# Patient Record
Sex: FEMALE | Race: WHITE | NOT HISPANIC OR LATINO | Employment: UNEMPLOYED | URBAN - METROPOLITAN AREA
[De-identification: names, ages, dates, MRNs, and addresses within clinical notes are randomized per-mention and may not be internally consistent; named-entity substitution may affect disease eponyms.]

---

## 2019-12-06 ENCOUNTER — OFFICE VISIT (OUTPATIENT)
Dept: URGENT CARE | Facility: CLINIC | Age: 24
End: 2019-12-06
Payer: COMMERCIAL

## 2019-12-06 VITALS
SYSTOLIC BLOOD PRESSURE: 111 MMHG | OXYGEN SATURATION: 98 % | RESPIRATION RATE: 18 BRPM | HEART RATE: 67 BPM | WEIGHT: 183 LBS | TEMPERATURE: 99 F | HEIGHT: 60 IN | DIASTOLIC BLOOD PRESSURE: 78 MMHG | BODY MASS INDEX: 35.93 KG/M2

## 2019-12-06 DIAGNOSIS — S09.11XA STRAIN OF JAW: Primary | ICD-10-CM

## 2019-12-06 DIAGNOSIS — S03.00XA TMJ (DISLOCATION OF TEMPOROMANDIBULAR JOINT), INITIAL ENCOUNTER: ICD-10-CM

## 2019-12-06 PROBLEM — E28.2 PCOS (POLYCYSTIC OVARIAN SYNDROME): Status: ACTIVE | Noted: 2019-12-06

## 2019-12-06 PROCEDURE — 99213 OFFICE O/P EST LOW 20 MIN: CPT | Mod: S$GLB,,, | Performed by: FAMILY MEDICINE

## 2019-12-06 PROCEDURE — 99213 PR OFFICE/OUTPT VISIT, EST, LEVL III, 20-29 MIN: ICD-10-PCS | Mod: S$GLB,,, | Performed by: FAMILY MEDICINE

## 2019-12-06 RX ORDER — CYCLOBENZAPRINE HCL 10 MG
10 TABLET ORAL NIGHTLY PRN
Qty: 10 TABLET | Refills: 0 | Status: SHIPPED | OUTPATIENT
Start: 2019-12-06 | End: 2019-12-16

## 2019-12-06 RX ORDER — MELOXICAM 15 MG/1
15 TABLET ORAL DAILY PRN
Qty: 15 TABLET | Refills: 0 | Status: SHIPPED | OUTPATIENT
Start: 2019-12-06

## 2019-12-06 NOTE — PATIENT INSTRUCTIONS
Treating Strains and Sprains  Strains and sprains happen when muscles or other soft tissues near your bones stretch or tear. These injuries can cause bruising, swelling, and pain. To ease your discomfort and speed the healing of your strain or sprain, follow the tips below. Remember, a strain or sprain can take 6 to 8 weeks to heal.     Important Note: Do not give aspirin to children or teens without discussing it with your healthcare provider first.        Ice first, heat later  · Use ice for the first 24 to 48 hours after injury. Ice helps prevent swelling and reduce pain. Ice the injury for no more than 20 minutes at a time and allow at least  20 minutes between icing sessions.  · Apply heat after the first 72 hours, once the swelling has gone down. Heat relaxes muscles and increases blood flow. Soak the injured area in warm water or use a heating pad set on low for no more than 15 minutes at a time.  Wrap and elevate  · Wrap an injured limb firmly with an elastic bandage. This provides support and helps prevent swelling. Dont wear an elastic bandage overnight. Watch for tingling, numbness, or increased pain, and remove the bandage immediately if any of these occurs.  · Elevate the injured area to help reduce swelling and throbbing. Its best to raise an injured limb above the level of your heart.     Medicines  · Over-the-counter medicines such as acetaminophen or ibuprofen can help reduce pain. Some also help reduce swelling.  · Take medicine only as directed.  · Rest the area even if medicines are controlling the pain.  Rest  · Rest the injured area by not using it for 24 hours.  · When youre ready, return slowly to your normal activities. Rest the injured area often.  · Dont use or walk on an injured limb if it hurts.  Date Last Reviewed: 9/3/2015  © 1415-9311 [employer]. [address] [place] [address], [place] [address]. All rights reserved. This information is not intended as a substitute for  professional medical care. Always follow your healthcare professional's instructions.        Jaw Dislocation  The jawbone (the mandible) is connected to the skull by a hinge joint in front of each ear (the temporomandibular joint, or TMJ). Your mandible has come out of the joint (it has been dislocated).     Causes  Anything that causes extreme opening of the mouth can cause a dislocation. This includes:  · Eating  · Yawning  · Laughing  · Vomiting  · Kissing  · Dental treatment  · Trauma  Symptoms  · Inability to close the mouth  · Pain  · Drooling  · Trouble speaking  Right after a dislocation, the jaw muscles often go into spasm. This prevents the jawbone from sliding back into the joint. To treat this, a healthcare provider must guide the mandible back into the joint. Intravenous (IV) medicines are often used to reduce the muscle spasm and keep you comfortable.  You may be given a bandage or soft collar. This is to keep the mouth from opening widely.  You may have another jaw dislocation. You are especially at risk during the next 6 weeks. It takes this long for the ligaments that support the joint to heal. Repeated mandible dislocation may cause arthritis and chronic pain at the joint. In these cases, surgery may be required to prevent further dislocations.  Home care  · To reduce pain and swelling, apply an ice pack (ice cubes in a plastic bag, wrapped in a towel) over the injured area for 10 to 20 minutes every 1 to 2 hours for the first day. Continue using an ice pack 3 to 4 times a day until the pain and swelling goes away.  · You can take acetaminophen or ibuprofen for pain, unless you were given a different pain medicine to use. (Note: If you have chronic liver or kidney disease, have ever had a stomach ulcer or gastrointestinal bleeding, or are taking blood-thinning medicines, talk with your doctor before using these medicines.) Do not give aspirin to anyone under 18 years of age who is ill with a fever.  It may cause severe liver damage.  · For the first week, eat only soft foods, such as gelatin, cooked cereal, ice cream, applesauce, bananas, eggs, pasta, cottage cheese, soups, and yogurt.  · For the next 6 weeks, do not open your mouth wide. Also be careful when you yawn, bite large pieces of food, scream or yell, sing, or call out loudly.  · If you need to yawn, put your fist under your chin to prevent your mouth from opening up too wide.  Follow-up care  Follow up with your healthcare provider, or as advised. If X-rays were taken, they will be reviewed by a specialist. You will be notified of the results, especially if they affect treatment.  Call 911  Call emergency services right away if any of these occur:  · Trouble breathing or swallowing, wheezing  · Confusion  · Extreme drowsiness or trouble awakening  · Fainting or loss of consciousness  · Rapid heart rate  When to seek medical advice  Call your healthcare provider right away if any of these occur:  · Jaw pain not relieved by the pain medicine  · Inability to close your mouth fully (recurrent dislocation)  Date Last Reviewed: 7/30/2015  © 5231-0503 [employer]. [address] [place] [address], [place] [address]. All rights reserved. This information is not intended as a substitute for professional medical care. Always follow your healthcare professional's instructions.

## 2019-12-06 NOTE — PROGRESS NOTES
Subjective:       Patient ID: Claudia Castillo is a 24 y.o. female.    Vitals:  height is 5' (1.524 m) and weight is 83 kg (183 lb). Her tympanic temperature is 99.2 °F (37.3 °C). Her blood pressure is 111/78 and her pulse is 67. Her respiration is 18 and oxygen saturation is 98%.     Chief Complaint: Jaw Pain    Patient present today with jaw pain started yesterday night, patient has not tried taking any medication to help with pain. Patient states she was having weird spasms when she woke up and it hard to drink liquids. Patient states she having difficulty breathing as well     Facial Pain   The current episode started yesterday. The problem has been gradually worsening. Pertinent negatives include no arthralgias, chest pain, chills, congestion, coughing, fatigue, fever, headaches, joint swelling, myalgias, nausea, rash, sore throat, vertigo, vomiting or weakness. The symptoms are aggravated by swallowing.       Constitution: Negative for chills, fatigue and fever.   HENT: Negative for congestion and sore throat.    Neck: Negative for painful lymph nodes.   Cardiovascular: Negative for chest pain and leg swelling.   Eyes: Negative for double vision and blurred vision.   Respiratory: Negative for cough and shortness of breath.    Gastrointestinal: Negative for nausea, vomiting and diarrhea.   Genitourinary: Negative for dysuria, frequency, urgency and history of kidney stones.   Musculoskeletal: Positive for pain. Negative for joint pain, joint swelling, muscle cramps and muscle ache.   Skin: Negative for color change, pale, rash and bruising.   Allergic/Immunologic: Negative for seasonal allergies.   Neurological: Negative for dizziness, history of vertigo, light-headedness, passing out and headaches.   Hematologic/Lymphatic: Negative for swollen lymph nodes.   Psychiatric/Behavioral: Negative for nervous/anxious, sleep disturbance and depression. The patient is not nervous/anxious.        Objective:      Physical  Exam      Assessment:       1. Strain of jaw    2. TMJ (dislocation of temporomandibular joint), initial encounter        Plan:         Strain of jaw  -     cyclobenzaprine (FLEXERIL) 10 MG tablet; Take 1 tablet (10 mg total) by mouth nightly as needed for Muscle spasms.  Dispense: 10 tablet; Refill: 0  -     meloxicam (MOBIC) 15 MG tablet; Take 1 tablet (15 mg total) by mouth daily as needed for Pain.  Dispense: 15 tablet; Refill: 0    TMJ (dislocation of temporomandibular joint), initial encounter  -     cyclobenzaprine (FLEXERIL) 10 MG tablet; Take 1 tablet (10 mg total) by mouth nightly as needed for Muscle spasms.  Dispense: 10 tablet; Refill: 0  -     meloxicam (MOBIC) 15 MG tablet; Take 1 tablet (15 mg total) by mouth daily as needed for Pain.  Dispense: 15 tablet; Refill: 0      Patient Instructions       Treating Strains and Sprains  Strains and sprains happen when muscles or other soft tissues near your bones stretch or tear. These injuries can cause bruising, swelling, and pain. To ease your discomfort and speed the healing of your strain or sprain, follow the tips below. Remember, a strain or sprain can take 6 to 8 weeks to heal.     Important Note: Do not give aspirin to children or teens without discussing it with your healthcare provider first.        Ice first, heat later  · Use ice for the first 24 to 48 hours after injury. Ice helps prevent swelling and reduce pain. Ice the injury for no more than 20 minutes at a time and allow at least  20 minutes between icing sessions.  · Apply heat after the first 72 hours, once the swelling has gone down. Heat relaxes muscles and increases blood flow. Soak the injured area in warm water or use a heating pad set on low for no more than 15 minutes at a time.  Wrap and elevate  · Wrap an injured limb firmly with an elastic bandage. This provides support and helps prevent swelling. Dont wear an elastic bandage overnight. Watch for tingling, numbness, or increased  pain, and remove the bandage immediately if any of these occurs.  · Elevate the injured area to help reduce swelling and throbbing. Its best to raise an injured limb above the level of your heart.     Medicines  · Over-the-counter medicines such as acetaminophen or ibuprofen can help reduce pain. Some also help reduce swelling.  · Take medicine only as directed.  · Rest the area even if medicines are controlling the pain.  Rest  · Rest the injured area by not using it for 24 hours.  · When youre ready, return slowly to your normal activities. Rest the injured area often.  · Dont use or walk on an injured limb if it hurts.  Date Last Reviewed: 9/3/2015  © 8061-9104 [employer]. [address] [place] [address], [place] [address]. All rights reserved. This information is not intended as a substitute for professional medical care. Always follow your healthcare professional's instructions.        Jaw Dislocation  The jawbone (the mandible) is connected to the skull by a hinge joint in front of each ear (the temporomandibular joint, or TMJ). Your mandible has come out of the joint (it has been dislocated).     Causes  Anything that causes extreme opening of the mouth can cause a dislocation. This includes:  · Eating  · Yawning  · Laughing  · Vomiting  · Kissing  · Dental treatment  · Trauma  Symptoms  · Inability to close the mouth  · Pain  · Drooling  · Trouble speaking  Right after a dislocation, the jaw muscles often go into spasm. This prevents the jawbone from sliding back into the joint. To treat this, a healthcare provider must guide the mandible back into the joint. Intravenous (IV) medicines are often used to reduce the muscle spasm and keep you comfortable.  You may be given a bandage or soft collar. This is to keep the mouth from opening widely.  You may have another jaw dislocation. You are especially at risk during the next 6 weeks. It takes this long for the ligaments that support the joint to heal.  Repeated mandible dislocation may cause arthritis and chronic pain at the joint. In these cases, surgery may be required to prevent further dislocations.  Home care  · To reduce pain and swelling, apply an ice pack (ice cubes in a plastic bag, wrapped in a towel) over the injured area for 10 to 20 minutes every 1 to 2 hours for the first day. Continue using an ice pack 3 to 4 times a day until the pain and swelling goes away.  · You can take acetaminophen or ibuprofen for pain, unless you were given a different pain medicine to use. (Note: If you have chronic liver or kidney disease, have ever had a stomach ulcer or gastrointestinal bleeding, or are taking blood-thinning medicines, talk with your doctor before using these medicines.) Do not give aspirin to anyone under 18 years of age who is ill with a fever. It may cause severe liver damage.  · For the first week, eat only soft foods, such as gelatin, cooked cereal, ice cream, applesauce, bananas, eggs, pasta, cottage cheese, soups, and yogurt.  · For the next 6 weeks, do not open your mouth wide. Also be careful when you yawn, bite large pieces of food, scream or yell, sing, or call out loudly.  · If you need to yawn, put your fist under your chin to prevent your mouth from opening up too wide.  Follow-up care  Follow up with your healthcare provider, or as advised. If X-rays were taken, they will be reviewed by a specialist. You will be notified of the results, especially if they affect treatment.  Call 911  Call emergency services right away if any of these occur:  · Trouble breathing or swallowing, wheezing  · Confusion  · Extreme drowsiness or trouble awakening  · Fainting or loss of consciousness  · Rapid heart rate  When to seek medical advice  Call your healthcare provider right away if any of these occur:  · Jaw pain not relieved by the pain medicine  · Inability to close your mouth fully (recurrent dislocation)  Date Last Reviewed: 7/30/2015  © 4863-0226  [employer]. [address] [place] [address], [place] [address]. All rights reserved. This information is not intended as a substitute for professional medical care. Always follow your healthcare professional's instructions.

## 2023-06-21 ENCOUNTER — HOSPITAL ENCOUNTER (EMERGENCY)
Facility: HOSPITAL | Age: 28
Discharge: HOME | End: 2023-06-21
Attending: STUDENT IN AN ORGANIZED HEALTH CARE EDUCATION/TRAINING PROGRAM
Payer: MEDICAID

## 2023-06-21 VITALS
HEART RATE: 74 BPM | WEIGHT: 200 LBS | BODY MASS INDEX: 39.27 KG/M2 | OXYGEN SATURATION: 98 % | HEIGHT: 60 IN | TEMPERATURE: 98 F | SYSTOLIC BLOOD PRESSURE: 110 MMHG | DIASTOLIC BLOOD PRESSURE: 77 MMHG | RESPIRATION RATE: 18 BRPM

## 2023-06-21 DIAGNOSIS — S09.90XA INJURY OF HEAD, INITIAL ENCOUNTER: Primary | ICD-10-CM

## 2023-06-21 DIAGNOSIS — S06.0X0A CONCUSSION WITHOUT LOSS OF CONSCIOUSNESS, INITIAL ENCOUNTER: ICD-10-CM

## 2023-06-21 LAB
B-HCG UR QL: NEGATIVE
CTP QC/QA: YES

## 2023-06-21 PROCEDURE — 81025 URINE PREGNANCY TEST: CPT | Performed by: STUDENT IN AN ORGANIZED HEALTH CARE EDUCATION/TRAINING PROGRAM

## 2023-06-21 PROCEDURE — 96374 THER/PROPH/DIAG INJ IV PUSH: CPT

## 2023-06-21 PROCEDURE — 63600175 PHARM REV CODE 636 W HCPCS: Performed by: STUDENT IN AN ORGANIZED HEALTH CARE EDUCATION/TRAINING PROGRAM

## 2023-06-21 PROCEDURE — 99285 EMERGENCY DEPT VISIT HI MDM: CPT | Mod: 25

## 2023-06-21 PROCEDURE — 25000003 PHARM REV CODE 250: Performed by: STUDENT IN AN ORGANIZED HEALTH CARE EDUCATION/TRAINING PROGRAM

## 2023-06-21 RX ORDER — BUTALBITAL, ACETAMINOPHEN AND CAFFEINE 50; 325; 40 MG/1; MG/1; MG/1
1 TABLET ORAL ONCE
Status: DISCONTINUED | OUTPATIENT
Start: 2023-06-21 | End: 2023-06-22 | Stop reason: HOSPADM

## 2023-06-21 RX ORDER — KETOROLAC TROMETHAMINE 30 MG/ML
15 INJECTION, SOLUTION INTRAMUSCULAR; INTRAVENOUS
Status: COMPLETED | OUTPATIENT
Start: 2023-06-21 | End: 2023-06-21

## 2023-06-21 RX ORDER — BUTALBITAL, ACETAMINOPHEN AND CAFFEINE 50; 325; 40 MG/1; MG/1; MG/1
1 TABLET ORAL EVERY 4 HOURS PRN
Qty: 10 TABLET | Refills: 0 | Status: SHIPPED | OUTPATIENT
Start: 2023-06-21 | End: 2023-07-21

## 2023-06-21 RX ADMIN — KETOROLAC TROMETHAMINE 15 MG: 30 INJECTION, SOLUTION INTRAMUSCULAR; INTRAVENOUS at 10:06

## 2023-06-21 RX ADMIN — SODIUM CHLORIDE 1000 ML: 0.9 INJECTION, SOLUTION INTRAVENOUS at 10:06

## 2023-06-22 NOTE — DISCHARGE INSTRUCTIONS

## 2023-06-22 NOTE — ED PROVIDER NOTES
"Encounter Date: 6/21/2023       History     Chief Complaint   Patient presents with    Head Injury     Pt states she fell into a 5ft hole 6 days ago, headache ever since. States she lost consciousness today. Denies injury today. No vomiting.  Blurry vision. Took Ibuprofen "few hrs ago"    Neck Pain     28-year-old female accidentally fell into a large hole in her backyard that was hidden underneath some grass clippings.  This occurred 5 days ago.  She struck the front of her head while falling.  Since that time she is had an ongoing headache associated with light sensitivity associated and nausea.  She also reports decreased sleep since injury.  Today, while cooking, she felt lightheaded and syncopized and woke up 3 hours later.  She denies any new injuries but still has ongoing headache.    Review of patient's allergies indicates:  No Known Allergies  No past medical history on file.  No past surgical history on file.  Family History   Problem Relation Age of Onset    Diabetes Father      Social History     Tobacco Use    Smoking status: Never   Substance Use Topics    Alcohol use: No    Drug use: No     Review of Systems   Constitutional:  Negative for activity change, appetite change, chills, fever and unexpected weight change.   HENT:  Negative for dental problem and drooling.    Eyes:  Negative for discharge and itching.   Respiratory:  Negative for cough, chest tightness, shortness of breath, wheezing and stridor.    Cardiovascular:  Negative for chest pain, palpitations and leg swelling.   Gastrointestinal:  Negative for abdominal distention, abdominal pain, diarrhea and nausea.   Genitourinary:  Negative for difficulty urinating, dysuria, frequency and urgency.   Musculoskeletal:  Negative for back pain, gait problem and joint swelling.   Neurological:  Positive for headaches. Negative for dizziness, syncope and numbness.   Psychiatric/Behavioral:  Negative for agitation, behavioral problems and confusion.  "     Physical Exam     Initial Vitals [06/21/23 2005]   BP Pulse Resp Temp SpO2   (!) 144/90 83 16 98.4 °F (36.9 °C) 98 %      MAP       --         Physical Exam    Nursing note and vitals reviewed.  Constitutional: She appears well-developed and well-nourished. She is not diaphoretic.   HENT:   Head: Normocephalic and atraumatic.   Mouth/Throat: Oropharynx is clear and moist.   Eyes: EOM are normal. Pupils are equal, round, and reactive to light. Right eye exhibits no discharge. Left eye exhibits no discharge.   Neck: No tracheal deviation present.   Normal range of motion.  Cardiovascular:  Normal rate, regular rhythm and intact distal pulses.           Pulmonary/Chest: No respiratory distress. She has no wheezes. She exhibits no tenderness.   Abdominal: Abdomen is soft. She exhibits no distension. There is no abdominal tenderness.   Musculoskeletal:         General: No tenderness or edema. Normal range of motion.      Cervical back: Normal range of motion.     Neurological: She is alert and oriented to person, place, and time. She has normal strength and normal reflexes. No cranial nerve deficit or sensory deficit. Coordination and gait normal. GCS eye subscore is 4. GCS verbal subscore is 5. GCS motor subscore is 6.   Normal finger to nose, heel to shin, rapid alternating movement.    Skin: Skin is warm and dry. No rash noted.   Psychiatric: She has a normal mood and affect. Her behavior is normal. Thought content normal.       ED Course   Procedures  Labs Reviewed   POCT URINE PREGNANCY          Imaging Results              CT Cervical Spine Without Contrast (Final result)  Result time 06/21/23 22:46:02      Final result by Emilie Montes MD (06/21/23 22:46:02)                   Narrative:    EXAM DESCRIPTION:  CT CERVICAL SPINE WITHOUT CONTRAST 6/21/2023 10:12 PM CDT  [facility]: CT CERVICAL SPINE WITHOUT IV CONTRAST    CLINICAL HISTORY:  28 years Female, Neck trauma, dangerous injury mechanism (Age  16-64y)    COMPARISON:  Cervical spine CT dated 11/11/2011    TECHNIQUE: Axial CT imaging of the cervical spine was performed without intravenous contrast material. Sagittal and coronal reconstructions were obtained.    The protocol utilizes one or more of the following dose reduction techniques: automated exposure control, adjustment of mA and/or kV according to patient size, and/or use of iterative reconstruction technique.    FINDINGS:    CURVATURE: Normal cervical lordosis is maintained.    BONES: No fracture, subluxation, or spondylolisthesis is seen. No lytic or blastic bone lesion is identified.    SOFT TISSUES: The visualized paraspinal soft tissues appear unremarkable.    No significant degenerative changes are appreciated. Please note evaluation for disc herniations, neural foramina narrowing, and spinal canal stenosis is limited on CT scan. These are better evaluated on MRI.    IMPRESSION:    UNREMARKABLE CT SCAN OF THE CERVICAL SPINE    Electronically signed by:  Emilie Montes MD  6/21/2023 10:46 PM CDT Workstation: [id number]                                     CT Head Without Contrast (Final result)  Result time 06/21/23 22:52:54      Final result by Emilie Montes MD (06/21/23 22:52:54)                   Narrative:    EXAM DESCRIPTION:  CT HEAD WITHOUT CONTRAST 6/21/2023 10:11 PM CDT  [facility]: CT HEAD WITHOUT IV CONTRAST    CLINICAL HISTORY:  28 years Female, Head trauma, intracranial venous injury suspected    COMPARISON:  Brain CT dated 11/11/2011    TECHNIQUE: Axial thin section CT images of the brain were obtained from the base of the skull to the vertex without intravenous contrast. Sagittal and coronal reconstructed images were also obtained.    The protocol utilizes one or more of the following dose reduction techniques: automated exposure control, adjustment of mA and/or kV according to patient size, and/or use of iterative reconstruction technique.    FINDINGS:    BRAIN: Gray-white matter  differentiation is preserved.  No evidence of an acute transcortical infarct is noted.    CEREBROSPINAL FLUID SPACES: Ventricles, cerebral sulci, and basal cisterns are normal for patient's age.  No hydrocephalus or ventricular entrapment is noted.  No extraaxial fluid collection is noted.    MASS EFFECT: There is no mass effect or midline shift.    HEMORRHAGE: No intracranial hemorrhage is noted.    SELLA: The sella and suprasellar cistern appear unremarkable.    PARANASAL SINUSES: The visualized portions of the paranasal sinuses are clear.    MASTOID AIR CELLS: The visualized portions of the mastoid air cells are well aerated.    ORBITS: The visualized portions of the orbits appear unremarkable.    SOFT TISSUES: No scalp soft tissue swelling is noted.    CALVARIUM: No calvarial fracture is noted.  No destructive lytic bone lesion is seen.    ATHEROSCLEROSIS: No significant calcified intracranial atherosclerosis is noted.    IMPRESSION:    NO ACUTE INTRACRANIAL ABNORMALITY IS NOTED.    Electronically signed by:  Emilie Montes MD  6/21/2023 10:52 PM CDT Workstation: [id number]                                     Medications   sodium chloride 0.9% bolus 1,000 mL 1,000 mL (1,000 mLs Intravenous New Bag 6/21/23 2242)   ketorolac injection 15 mg (15 mg Intravenous Given 6/21/23 2241)                            28-year-old female presents for 5 day history of headache after a fall 5 ft, striking her face and extending or neck.  Vitals normal.  She has no neurological deficit on my exam.  She has no signs of active face trauma.  Given the fall and ongoing headache, CT head ordered to rule out acute intracranial abnormality.  No evidence of skull fracture, subdural or epidural hematoma.  Patient's symptoms are most consistent with concussion.  Patient's pain treated here with Toradol, IV fluids with resolution of headache.  I advised her to continue taking ibuprofen at home along with Tylenol every 6 hours for pain, and  discharge her with concussion management advice.  She is comfortable with plan and ready to go home.  Clinical Impression:   Final diagnoses:  [S09.90XA] Injury of head, initial encounter (Primary)  [S06.0X0A] Concussion without loss of consciousness, initial encounter        ED Disposition Condition    Discharge Stable          ED Prescriptions       Medication Sig Dispense Start Date End Date Auth. Provider    butalbital-acetaminophen-caffeine -40 mg (FIORICET, ESGIC) -40 mg per tablet Take 1 tablet by mouth every 4 (four) hours as needed for Pain. 10 tablet 6/21/2023 7/21/2023 David Craven MD          Follow-up Information       Follow up With Specialties Details Why Contact [facility]  Schedule an appointment as soon as possible for a visit in 1 day to establish primary care physician Elicia REYES [id number]  [phone]               David Craven MD  06/23/23 0330

## 2025-04-14 ENCOUNTER — HOSPITAL ENCOUNTER (EMERGENCY)
Facility: HOSPITAL | Age: 30
Discharge: HOME | End: 2025-04-14
Attending: EMERGENCY MEDICINE
Payer: MEDICAID

## 2025-04-14 VITALS
SYSTOLIC BLOOD PRESSURE: 114 MMHG | RESPIRATION RATE: 18 BRPM | WEIGHT: 215 LBS | HEART RATE: 80 BPM | TEMPERATURE: 99 F | BODY MASS INDEX: 42.21 KG/M2 | HEIGHT: 60 IN | OXYGEN SATURATION: 98 % | DIASTOLIC BLOOD PRESSURE: 72 MMHG

## 2025-04-14 DIAGNOSIS — R07.9 CHEST PAIN, UNSPECIFIED TYPE: Primary | ICD-10-CM

## 2025-04-14 DIAGNOSIS — R07.9 CHEST PAIN: ICD-10-CM

## 2025-04-14 DIAGNOSIS — R55 SYNCOPE, UNSPECIFIED SYNCOPE TYPE: ICD-10-CM

## 2025-04-14 LAB
ABSOLUTE EOSINOPHIL (SMH): 0.13 K/UL
ABSOLUTE MONOCYTE (SMH): 1.11 K/UL (ref 0.3–1)
ABSOLUTE NEUTROPHIL COUNT (SMH): 8.4 K/UL (ref 1.8–7.7)
ALBUMIN SERPL-MCNC: 4.2 G/DL (ref 3.5–5.2)
ALP SERPL-CCNC: 45 UNIT/L (ref 55–135)
ALT SERPL-CCNC: 24 UNIT/L (ref 10–44)
ANION GAP (SMH): 7 MMOL/L (ref 8–16)
AST SERPL-CCNC: 15 UNIT/L (ref 10–40)
B-HCG FREE SERPL-ACNC: <0.6 MIU/ML
B-HCG UR QL: NEGATIVE
BASOPHILS # BLD AUTO: 0.08 K/UL
BASOPHILS NFR BLD AUTO: 0.6 %
BILIRUB SERPL-MCNC: 0.3 MG/DL (ref 0.1–1)
BNP SERPL-MCNC: 23 PG/ML
BUN SERPL-MCNC: 10 MG/DL (ref 6–20)
CALCIUM SERPL-MCNC: 9 MG/DL (ref 8.7–10.5)
CHLORIDE SERPL-SCNC: 105 MMOL/L (ref 95–110)
CO2 SERPL-SCNC: 23 MMOL/L (ref 23–29)
CREAT SERPL-MCNC: 0.8 MG/DL (ref 0.5–1.4)
CTP QC/QA: YES
D DIMER PPP IA.FEU-MCNC: 0.33 MG/L FEU
ERYTHROCYTE [DISTWIDTH] IN BLOOD BY AUTOMATED COUNT: 13.8 % (ref 11.5–14.5)
GFR SERPLBLD CREATININE-BSD FMLA CKD-EPI: >60 ML/MIN/1.73/M2
GLUCOSE SERPL-MCNC: 103 MG/DL (ref 70–110)
HCT VFR BLD AUTO: 42.5 % (ref 37–48.5)
HGB BLD-MCNC: 13.7 GM/DL (ref 12–16)
IMM GRANULOCYTES # BLD AUTO: 0.21 K/UL (ref 0–0.04)
IMM GRANULOCYTES NFR BLD AUTO: 1.6 % (ref 0–0.5)
LYMPHOCYTES # BLD AUTO: 3.6 K/UL (ref 1–4.8)
MAGNESIUM SERPL-MCNC: 2 MG/DL (ref 1.6–2.6)
MCH RBC QN AUTO: 28.1 PG (ref 27–31)
MCHC RBC AUTO-ENTMCNC: 32.2 G/DL (ref 32–36)
MCV RBC AUTO: 87 FL (ref 82–98)
NUCLEATED RBC (/100WBC) (SMH): 0 /100 WBC
PLATELET # BLD AUTO: 321 K/UL (ref 150–450)
PMV BLD AUTO: 9.8 FL (ref 9.2–12.9)
POTASSIUM SERPL-SCNC: 3.6 MMOL/L (ref 3.5–5.1)
PROT SERPL-MCNC: 7.4 GM/DL (ref 6–8.4)
RBC # BLD AUTO: 4.88 M/UL (ref 4–5.4)
RELATIVE EOSINOPHIL (SMH): 1 % (ref 0–8)
RELATIVE LYMPHOCYTE (SMH): 26.7 % (ref 18–48)
RELATIVE MONOCYTE (SMH): 8.2 % (ref 4–15)
RELATIVE NEUTROPHIL (SMH): 61.9 % (ref 38–73)
SODIUM SERPL-SCNC: 135 MMOL/L (ref 136–145)
TROPONIN HIGH SENSITIVE (SMH): <2.3 PG/ML
TROPONIN HIGH SENSITIVE (SMH): <2.3 PG/ML
TSH SERPL-ACNC: 1.52 UIU/ML (ref 0.34–5.6)
WBC # BLD AUTO: 13.48 K/UL (ref 3.9–12.7)

## 2025-04-14 PROCEDURE — 80053 COMPREHEN METABOLIC PANEL: CPT | Performed by: EMERGENCY MEDICINE

## 2025-04-14 PROCEDURE — 81025 URINE PREGNANCY TEST: CPT | Performed by: EMERGENCY MEDICINE

## 2025-04-14 PROCEDURE — 84702 CHORIONIC GONADOTROPIN TEST: CPT | Performed by: EMERGENCY MEDICINE

## 2025-04-14 PROCEDURE — 83735 ASSAY OF MAGNESIUM: CPT | Performed by: EMERGENCY MEDICINE

## 2025-04-14 PROCEDURE — 84443 ASSAY THYROID STIM HORMONE: CPT | Performed by: EMERGENCY MEDICINE

## 2025-04-14 PROCEDURE — 85025 COMPLETE CBC W/AUTO DIFF WBC: CPT | Performed by: EMERGENCY MEDICINE

## 2025-04-14 PROCEDURE — 93010 ELECTROCARDIOGRAM REPORT: CPT | Mod: ,,, | Performed by: GENERAL PRACTICE

## 2025-04-14 PROCEDURE — 99285 EMERGENCY DEPT VISIT HI MDM: CPT | Mod: 25

## 2025-04-14 PROCEDURE — 83880 ASSAY OF NATRIURETIC PEPTIDE: CPT | Performed by: EMERGENCY MEDICINE

## 2025-04-14 PROCEDURE — 25000003 PHARM REV CODE 250: Performed by: EMERGENCY MEDICINE

## 2025-04-14 PROCEDURE — 96360 HYDRATION IV INFUSION INIT: CPT

## 2025-04-14 PROCEDURE — 36415 COLL VENOUS BLD VENIPUNCTURE: CPT | Performed by: EMERGENCY MEDICINE

## 2025-04-14 PROCEDURE — 85379 FIBRIN DEGRADATION QUANT: CPT | Performed by: EMERGENCY MEDICINE

## 2025-04-14 PROCEDURE — 93005 ELECTROCARDIOGRAM TRACING: CPT | Performed by: GENERAL PRACTICE

## 2025-04-14 PROCEDURE — 84484 ASSAY OF TROPONIN QUANT: CPT | Performed by: EMERGENCY MEDICINE

## 2025-04-14 RX ORDER — SODIUM CHLORIDE 9 MG/ML
1000 INJECTION, SOLUTION INTRAVENOUS
Status: COMPLETED | OUTPATIENT
Start: 2025-04-14 | End: 2025-04-14

## 2025-04-14 RX ORDER — PANTOPRAZOLE SODIUM 20 MG/1
20 TABLET, DELAYED RELEASE ORAL DAILY
Qty: 30 TABLET | Refills: 0 | Status: SHIPPED | OUTPATIENT
Start: 2025-04-14 | End: 2026-04-14

## 2025-04-14 RX ADMIN — SODIUM CHLORIDE 1000 ML: 9 INJECTION, SOLUTION INTRAVENOUS at 04:04

## 2025-04-14 NOTE — DISCHARGE INSTRUCTIONS
Please follow up with [facility] as directed for further evaluation, definitive care  Return if condition becomes worse or for any concerns

## 2025-04-14 NOTE — ED NOTES
04/14/25 1429 04/14/25 1430 04/14/25 1431   Vital Signs   Pulse 87 82 85   Resp 18  --   --    /67 129/80 125/79   Patient Position Lying Sitting Standing     Orthostatic VS

## 2025-04-14 NOTE — Clinical Note
"Claudia Simmons" Jonathan was seen and treated in our emergency department on 4/14/2025.  She may return to work on 04/16/2025.       If you have any questions or concerns, please don't hesitate to call.      Marisol Hammond, MAGGIE"

## 2025-04-14 NOTE — ED PROVIDER NOTES
Encounter Date: 4/14/2025       History     Chief Complaint   Patient presents with    Chest Pain     Pt states she has pressure in her chest every morning when she wakes up x 2 weeks. Also requesting a blood pregnancy test.      29-year-old female presents emergency department denies any significant medical history presents emergency department reports that she has a history of PCOS only, currently on metformin she does have a history of irregular menstrual cycles her last cycle was February 17th she states for about 3 weeks now she has been waking up with chest pressure, she also reports, fatigue, intermittent dizziness, with near syncope.  Patient reports she took 2 home pregnancy tests that were negative, she is requesting a blood test to see if she is pregnant.  Patient denies smoking history, birth control pills, no recent URI symptoms or fevers.  Denies any known ill contacts.  Patient's parents are alive and well with no significant medical history, there has been no sudden death in the family at an early age.  Patient is not taking any medications over-the-counter for relief of symptoms, she is unsure of what makes symptoms better or worse.     The history is provided by the patient.     Review of patient's allergies indicates:  No Known Allergies  History reviewed. No pertinent past medical history.  History reviewed. No pertinent surgical history.  Family History   Problem Relation Name Age of Onset    Diabetes Father       Social History[1]  Review of Systems   Constitutional: Negative.  Negative for chills, diaphoresis and fever.   HENT: Negative.     Respiratory:  Negative for cough and shortness of breath.    Cardiovascular:  Positive for chest pain.   Gastrointestinal: Negative.    Genitourinary: Negative.    Musculoskeletal: Negative.    Neurological:  Positive for dizziness.   Hematological: Negative.    Psychiatric/Behavioral: Negative.     All other systems reviewed and are  negative.      Physical Exam     Initial Vitals [04/14/25 1317]   BP Pulse Resp Temp SpO2   135/81 101 18 99.3 °F (37.4 °C) 98 %      MAP       --         Physical Exam    Nursing note and vitals reviewed.  Constitutional: She appears well-developed and well-nourished.   HENT:   Head: Normocephalic and atraumatic.   Right Ear: External ear normal.   Left Ear: External ear normal.   Eyes: Conjunctivae and EOM are normal. Pupils are equal, round, and reactive to light.   Neck: Neck supple.   Normal range of motion.  Cardiovascular:  Normal rate, regular rhythm, normal heart sounds and intact distal pulses.           Pulmonary/Chest: Breath sounds normal. No respiratory distress. She exhibits no tenderness.   Abdominal: Abdomen is soft. Bowel sounds are normal. She exhibits no distension and no mass. There is no abdominal tenderness. There is no rebound and no guarding.   Musculoskeletal:         General: Normal range of motion.      Cervical back: Normal range of motion and neck supple.     Neurological: She is alert and oriented to person, place, and time. She has normal strength. GCS score is 15. GCS eye subscore is 4. GCS verbal subscore is 5. GCS motor subscore is 6.   Skin: Skin is warm. No rash noted.   Psychiatric: She has a normal mood and affect.         ED Course   Procedures  Labs Reviewed   COMPREHENSIVE METABOLIC PANEL - Abnormal       Result Value    Sodium 135 (*)     Potassium 3.6      Chloride 105      CO2 23      Glucose 103      BUN 10      Creatinine 0.8      Calcium 9.0      Protein Total 7.4      Albumin 4.2      Bilirubin Total 0.3      ALP 45 (*)     AST 15      ALT 24      Anion Gap 7 (*)     eGFR >60     CBC WITH DIFFERENTIAL - Abnormal    WBC 13.48 (*)     RBC 4.88      Hgb 13.7      Hct 42.5      MCV 87      MCH 28.1      MCHC 32.2      RDW 13.8      Platelet Count 321      MPV 9.8      Nucleated RBC 0      Neut % 61.9      Lymph % 26.7      Mono % 8.2      Eos % 1.0      Basophil % 0.6       Imm Grans % 1.6 (*)     Neut # 8.4 (*)     Lymph # 3.60      Mono # 1.11 (*)     Eos # 0.13      Baso # 0.08      Imm Grans # 0.21 (*)    MAGNESIUM - Normal    Magnesium 2.0     TROPONIN I HIGH SENSITIVITY - Normal    Troponin High Sensitive <2.3     TROPONIN I HIGH SENSITIVITY - Normal    Troponin High Sensitive <2.3     B-TYPE NATRIURETIC PEPTIDE - Normal    BNP 23     TSH - Normal    TSH 1.521     D DIMER, QUANTITATIVE - Normal    D-Dimer 0.33     CBC W/ AUTO DIFFERENTIAL    Narrative:     The following orders were created for panel order CBC auto differential.  Procedure                               Abnormality         Status                     ---------                               -----------         ------                     CBC with Differential[[id number]]       Abnormal            Final result                 Please view results for these tests on the individual orders.   HCG, QUANTITATIVE    Beta HCG Quant <0.60     EXTRA TUBES    Narrative:     The following orders were created for panel order EXTRA TUBES.  Procedure                               Abnormality         Status                     ---------                               -----------         ------                     Light Blue Top Hold[[id number]]                             In process                   Please view results for these tests on the individual orders.   LIGHT BLUE TOP HOLD   POCT URINE PREGNANCY    POC Preg Test, Ur Negative       Acceptable Yes          ECG Results              EKG 12-lead (In process)        Collection Time Result Time QRS Duration OHS QTC Calculation    04/14/25 13:22:51 04/14/25 13:42:24 98 435                     In process by Interface, Lab In [facility] (04/14/25 13:42:30)                   Narrative:    Test Reason : R07.9,    Vent. Rate :  89 BPM     Atrial Rate :  89 BPM     P-R Int : 152 ms          QRS Dur :  98 ms      QT Int : 358 ms       P-R-T Axes :  60   7  43 degrees    QTcB  Int : 435 ms    Normal sinus rhythm  Normal ECG  No previous ECGs available    Referred By: AAAREFERRAL SELF           Confirmed By:                                   Imaging Results              X-Ray Chest PA And Lateral (Final result)  Result time 04/14/25 16:29:37   Procedure changed from X-Ray Chest AP Portable     Final result by Taras Stark MD (04/14/25 16:29:37)                   Impression:      No evidence of acute cardiopulmonary disease.      Electronically signed by: Taras Stark  Date:    04/14/2025  Time:    16:29               Narrative:    EXAMINATION:  XR CHEST PA AND LATERAL    CLINICAL HISTORY:  Chest Pain;    FINDINGS:  PA and lateral chest radiograph compared to prior exams show the trachea is midline, with the cardiomediastinal silhouette and pulmonary vascular distribution within normal limits.    There is unchanged asymmetric elevation of the right hemidiaphragm, with no consolidation, pleural effusion or evidence of pulmonary edema. No confluent infiltrates or pneumothorax. There are no significant osseous abnormalities.                                       Medications   0.9% NaCl infusion (0 mLs Intravenous Stopped 4/14/25 1724)     Medical Decision Making  29-year-old female presents emergency department denies any significant medical history presents emergency department reports that she has a history of PCOS only, currently on metformin she does have a history of irregular menstrual cycles her last cycle was February 17th she states for about 3 weeks now she has been waking up with chest pressure, she also reports, fatigue, intermittent dizziness, with near syncope.  Patient reports she took 2 home pregnancy tests that were negative, she is requesting a blood test to see if she is pregnant.  Patient denies smoking history, birth control pills, no recent URI symptoms or fevers.  Denies any known ill contacts.  Patient's parents are alive and well with no significant medical history,  there has been no sudden death in the family at an early age.  Patient is not taking any medications over-the-counter for relief of symptoms, she is unsure of what makes symptoms better or worse.     The history is provided by the patient.     Considerations include but not limited to, ACS, electrolyte abnormalities, pregnancy, dehydration, GERD, pneumonia, CVA    29-year-old female presents emergency department with multiple complaints and symptoms including intermittent dizziness, intermittent chest pain, patient reported that she was concerned that she maybe pregnant and all her symptoms related to pregnancy.  Patient's UPT here is negative she also had a hCG level which was negative.  Patient had no significant electrolyte abnormality she had 2 high sensitivity troponins that were normal she has an EKG which revealed normal sinus rhythm with no ischemic changes or ST elevation.  Urinalysis was negative patient was not orthostatic.  Patient was given a L of fluids, she reports that she feels better.  Patient's symptoms may be related to GERD as she reports that she is having intermittent chest pain that she notices when she wakes up.  For this reason I will start the patient on a PPI.  Patient will be [facility] to [facility].  Patient will also be referred to have an outpatient stress, and to monitor as well as follow up with Cardiology for near syncope.  The patient was personally evaluated by DR mirza as well       Attending Note:  I provided a face to face evaluation of this patient.  I discussed the patient's care with Advanced Practice Clinician.  I reviewed their note and agree with the history, physical, assessment, diagnosis, treatment, all procedures performed, xray and EKG interpretations and discharge plan provided by the Advanced Practice Clinician. My overall impression is chest pain, syncope.  Patient is a 29-year-old female with history of obesity and PCOS feeling fatigued, chest pressure each  morning that has resolved after a proximally 10 minutes and that has not returned throughout the day and reports that she has been more fatigued than usual in his passed out twice in the past several weeks.  No personal or family cardiac history.  No history of PE or DVT no drug use.  She is concerned for pregnancy but that has not pregnant.   The patient has been instructed to follow up with their physician or the one provided as well as specific return precautions.   Lisandra Hill M.D. 4/14/2025 6:46 PM        Amount and/or Complexity of Data Reviewed  External Data Reviewed: labs, radiology, ECG and notes.  Labs: ordered. Decision-making details documented in ED Course.  Radiology: ordered. Decision-making details documented in ED Course.  ECG/medicine tests: ordered. Decision-making details documented in ED Course.    Risk  Prescription drug management.      Additional MDM:   PERC Rule:   Age is greater than or equal to 50 = 0.0  Heart Rate is greater than or equal to 100 = 0.0  SaO2 on room air < 95% = 0.0  Unilateral leg swelling = 0.0  Hemoptysis = 0.0  Recent surgery or trauma = 0.0  Prior PE or DVT =  0.0  Hormone use = 0.00  PERC Score = 0              ED Course as of 04/14/25 1756   Mon Apr 14, 2025   1719 Beta quant less than 0.6  D-dimer negative  Troponin negative x2  Normal TSH  Normal BNP  White count 13.48 otherwise normal normal Mag and normal CMP [RM]   1719 I independently interpreted and reviewed chest x-ray which reveals no acute abnormality [RM]      ED Course User Index  [RM] Lisandra Hill MD                           Clinical Impression:  Final diagnoses:  [R07.9] Chest pain  [R07.9] Chest pain, unspecified type (Primary)  [R55] Syncope, unspecified syncope type          ED Disposition Condition    Discharge Stable          ED Prescriptions       Medication Sig Dispense Start Date End Date Auth. Provider    pantoprazole (PROTONIX) 20 MG tablet Take 1 tablet (20 mg total) by mouth  once daily. 30 tablet 4/14/2025 4/14/2026 Marisol Hammond FNP          Follow-up Information       Follow up With Specialties Details Why Contact Info    [facility]  Schedule an appointment as soon as possible for a visit in 2 days  [address] [place] [id number]  [phone]                 Marisol Hammond FNP  04/14/25 5956         [1]   Social History  Tobacco Use    Smoking status: Never   Substance Use Topics    Alcohol use: No    Drug use: No        Lisandra Hill MD  04/14/25 9625

## 2025-04-15 LAB
OHS QRS DURATION: 98 MS
OHS QTC CALCULATION: 435 MS

## 2025-04-15 NOTE — PLAN OF CARE
04/15/25 1052   Discharge Reassessment   Assessment Type Discharge Planning Reassessment   Did the patient's condition or plan change since previous assessment? Yes   Discharge Plan A Home with family   Discharge Plan B Home   Post-Acute Status   Discharge Delays None known at this time     Pt has [facility] appointment scheduled for on 4/28/2025 w/Anna Singh NP arrival time 12:00 P.M. Please bring ER D/C paperwork, proof of income/insurance, ID, and medication list. If you can't keep this appointment please contact [employer] [phone]. Pt will need referral for Holter monitor.

## 2025-04-16 ENCOUNTER — TELEPHONE (OUTPATIENT)
Dept: CARDIOLOGY | Facility: HOSPITAL | Age: 30
End: 2025-04-16

## 2025-04-17 ENCOUNTER — HOSPITAL ENCOUNTER (OUTPATIENT)
Dept: CARDIOLOGY | Facility: CLINIC | Age: 30
Discharge: HOME | End: 2025-04-17
Attending: EMERGENCY MEDICINE
Payer: MEDICAID

## 2025-04-17 ENCOUNTER — HOSPITAL ENCOUNTER (OUTPATIENT)
Dept: RADIOLOGY | Facility: HOSPITAL | Age: 30
Discharge: HOME | End: 2025-04-17
Attending: EMERGENCY MEDICINE
Payer: MEDICAID

## 2025-04-17 ENCOUNTER — CLINICAL SUPPORT (OUTPATIENT)
Dept: CARDIOLOGY | Facility: HOSPITAL | Age: 30
End: 2025-04-17
Attending: EMERGENCY MEDICINE
Payer: MEDICAID

## 2025-04-17 DIAGNOSIS — R55 SYNCOPE, UNSPECIFIED SYNCOPE TYPE: ICD-10-CM

## 2025-04-17 LAB
CV STRESS BASE HR: 77 BPM
DIASTOLIC BLOOD PRESSURE: 78 MMHG
OHS CV CPX 1 MINUTE RECOVERY HEART RATE: 108 BPM
OHS CV CPX 85 PERCENT MAX PREDICTED HEART RATE MALE: 162
OHS CV CPX ESTIMATED METS: 7.7
OHS CV CPX MAX PREDICTED HEART RATE: 191
OHS CV CPX PATIENT IS FEMALE: 1
OHS CV CPX PATIENT IS MALE: 0
OHS CV CPX PEAK DIASTOLIC BLOOD PRESSURE: 88 MMHG
OHS CV CPX PEAK HEAR RATE: 164 BPM
OHS CV CPX PEAK RATE PRESSURE PRODUCT: NORMAL
OHS CV CPX PEAK SYSTOLIC BLOOD PRESSURE: 154 MMHG
OHS CV CPX PERCENT MAX PREDICTED HEART RATE ACHIEVED: 91
OHS CV CPX RATE PRESSURE PRODUCT PRESENTING: 8239
STRESS ECHO POST EXERCISE DUR MIN: 5 MINUTES
STRESS ECHO POST EXERCISE DUR SEC: 40 SECONDS
SYSTOLIC BLOOD PRESSURE: 107 MMHG

## 2025-04-17 PROCEDURE — A9502 TC99M TETROFOSMIN: HCPCS | Performed by: EMERGENCY MEDICINE

## 2025-04-17 PROCEDURE — 93018 CV STRESS TEST I&R ONLY: CPT | Mod: ,,, | Performed by: GENERAL PRACTICE

## 2025-04-17 PROCEDURE — 93017 CV STRESS TEST TRACING ONLY: CPT

## 2025-04-17 PROCEDURE — 78452 HT MUSCLE IMAGE SPECT MULT: CPT | Mod: 26,,, | Performed by: RADIOLOGY

## 2025-04-17 PROCEDURE — 93016 CV STRESS TEST SUPVJ ONLY: CPT | Mod: ,,, | Performed by: GENERAL PRACTICE

## 2025-04-17 PROCEDURE — 78452 HT MUSCLE IMAGE SPECT MULT: CPT | Mod: TC

## 2025-04-17 RX ADMIN — TETROFOSMIN 26.4 MILLICURIE: 0.23 INJECTION, POWDER, LYOPHILIZED, FOR SOLUTION INTRAVENOUS at 10:04

## 2025-04-17 RX ADMIN — TETROFOSMIN 10.6 MILLICURIE: 0.23 INJECTION, POWDER, LYOPHILIZED, FOR SOLUTION INTRAVENOUS at 08:04

## 2025-04-17 NOTE — NURSING NOTE
Nuclear Treadmill Stress Test completed. Pt got lightheaded at end of exercise with hypotension. Pt was positioned in trendelenburg post stress with positive response. NP Makeda made aware of EKG changes and BP. Pt remained awake and alert during whole test and recovery. NP made aware of pt also had a vagal response to IV insertion which pt reported has happened before. Prior to getting on treadmill orthostatics were taken and WNL. No dizziness reported prior starting treadmill. Patient verbalized understanding of pre-post test instructions. Discharged from stress lab per Cardiology

## 2025-04-17 NOTE — NURSING
Patient advised that the stress and/or nuclear portions of the test were abnormal.  Patient should follow up with Cardiology. Messaged [facility] office, office not accepting new patients with Medicaid. Messaged Anne Agrawal, with Case management to help assist patient to find cardiologist that accepts new patients with Medicaid.       Patient advised if any chest pain/pressure prior to follow up appointment, to return to the ED for further assessment.   Patient verbalized understanding.

## 2025-08-24 ENCOUNTER — OFFICE VISIT (OUTPATIENT)
Dept: URGENT CARE | Facility: CLINIC | Age: 30
End: 2025-08-24
Payer: COMMERCIAL

## 2025-08-24 ENCOUNTER — HOSPITAL ENCOUNTER (EMERGENCY)
Facility: HOSPITAL | Age: 30
Discharge: HOME | End: 2025-08-24
Attending: STUDENT IN AN ORGANIZED HEALTH CARE EDUCATION/TRAINING PROGRAM
Payer: MEDICAID

## 2025-08-24 VITALS
DIASTOLIC BLOOD PRESSURE: 75 MMHG | BODY MASS INDEX: 41.23 KG/M2 | WEIGHT: 210 LBS | SYSTOLIC BLOOD PRESSURE: 109 MMHG | RESPIRATION RATE: 19 BRPM | TEMPERATURE: 98 F | HEIGHT: 60 IN | HEART RATE: 88 BPM | OXYGEN SATURATION: 98 %

## 2025-08-24 VITALS
OXYGEN SATURATION: 98 % | RESPIRATION RATE: 16 BRPM | HEIGHT: 60 IN | HEART RATE: 70 BPM | DIASTOLIC BLOOD PRESSURE: 83 MMHG | TEMPERATURE: 99 F | SYSTOLIC BLOOD PRESSURE: 120 MMHG | WEIGHT: 210 LBS | BODY MASS INDEX: 41.23 KG/M2

## 2025-08-24 DIAGNOSIS — R51.9 NONINTRACTABLE HEADACHE, UNSPECIFIED CHRONICITY PATTERN, UNSPECIFIED HEADACHE TYPE: Primary | ICD-10-CM

## 2025-08-24 DIAGNOSIS — R47.81 SLURRED SPEECH: ICD-10-CM

## 2025-08-24 DIAGNOSIS — R51.9 ACUTE NONINTRACTABLE HEADACHE, UNSPECIFIED HEADACHE TYPE: Primary | ICD-10-CM

## 2025-08-24 DIAGNOSIS — R55 NEAR SYNCOPE: ICD-10-CM

## 2025-08-24 LAB
ABSOLUTE EOSINOPHIL (SMH): 0.09 K/UL
ABSOLUTE MONOCYTE (SMH): 0.85 K/UL (ref 0.3–1)
ABSOLUTE NEUTROPHIL COUNT (SMH): 6.7 K/UL (ref 1.8–7.7)
ALBUMIN SERPL-MCNC: 4.3 G/DL (ref 3.5–5.2)
ALP SERPL-CCNC: 43 UNIT/L (ref 55–135)
ALT SERPL-CCNC: 34 UNIT/L (ref 10–44)
ANION GAP (SMH): 7 MMOL/L (ref 8–16)
AST SERPL-CCNC: 19 UNIT/L (ref 10–40)
B-HCG UR QL: NEGATIVE
BASOPHILS # BLD AUTO: 0.07 K/UL
BASOPHILS NFR BLD AUTO: 0.7 %
BILIRUB SERPL-MCNC: 0.5 MG/DL (ref 0.1–1)
BILIRUB UR QL STRIP.AUTO: NEGATIVE
BUN SERPL-MCNC: 10 MG/DL (ref 6–20)
CALCIUM SERPL-MCNC: 9.5 MG/DL (ref 8.7–10.5)
CHLORIDE SERPL-SCNC: 104 MMOL/L (ref 95–110)
CLARITY UR: CLEAR
CO2 SERPL-SCNC: 24 MMOL/L (ref 23–29)
COLOR UR AUTO: YELLOW
CREAT SERPL-MCNC: 0.7 MG/DL (ref 0.5–1.4)
CTP QC/QA: YES
ERYTHROCYTE [DISTWIDTH] IN BLOOD BY AUTOMATED COUNT: 13.8 % (ref 11.5–14.5)
GFR SERPLBLD CREATININE-BSD FMLA CKD-EPI: >60 ML/MIN/1.73/M2
GLUCOSE SERPL-MCNC: 97 MG/DL (ref 70–110)
GLUCOSE UR QL STRIP: NEGATIVE
HCT VFR BLD AUTO: 43 % (ref 37–48.5)
HCV AB SERPL QL IA: NORMAL
HGB BLD-MCNC: 13.7 GM/DL (ref 12–16)
HGB UR QL STRIP: ABNORMAL
HIV 1+2 AB+HIV1 P24 AG SERPL QL IA: NORMAL
IMM GRANULOCYTES # BLD AUTO: 0.13 K/UL (ref 0–0.04)
IMM GRANULOCYTES NFR BLD AUTO: 1.3 % (ref 0–0.5)
KETONES UR QL STRIP: NEGATIVE
LEUKOCYTE ESTERASE UR QL STRIP: NEGATIVE
LYMPHOCYTES # BLD AUTO: 2.54 K/UL (ref 1–4.8)
MCH RBC QN AUTO: 27.3 PG (ref 27–31)
MCHC RBC AUTO-ENTMCNC: 31.9 G/DL (ref 32–36)
MCV RBC AUTO: 86 FL (ref 82–98)
MICROSCOPIC COMMENT: NORMAL
NITRITE UR QL STRIP: NEGATIVE
NUCLEATED RBC (/100WBC) (SMH): 0 /100 WBC
PH UR STRIP: 6 [PH]
PLATELET # BLD AUTO: 330 K/UL (ref 150–450)
PMV BLD AUTO: 9.9 FL (ref 9.2–12.9)
POTASSIUM SERPL-SCNC: 3.8 MMOL/L (ref 3.5–5.1)
PROT SERPL-MCNC: 7.7 GM/DL (ref 6–8.4)
PROT UR QL STRIP: NEGATIVE
RBC # BLD AUTO: 5.01 M/UL (ref 4–5.4)
RBC #/AREA URNS AUTO: 4 /HPF
RELATIVE EOSINOPHIL (SMH): 0.9 % (ref 0–8)
RELATIVE LYMPHOCYTE (SMH): 24.5 % (ref 18–48)
RELATIVE MONOCYTE (SMH): 8.2 % (ref 4–15)
RELATIVE NEUTROPHIL (SMH): 64.4 % (ref 38–73)
SODIUM SERPL-SCNC: 135 MMOL/L (ref 136–145)
SP GR UR STRIP: 1.02
SQUAMOUS #/AREA URNS AUTO: 3 /HPF
UROBILINOGEN UR STRIP-ACNC: NEGATIVE EU/DL
WBC # BLD AUTO: 10.36 K/UL (ref 3.9–12.7)
WBC #/AREA URNS AUTO: <1 /HPF

## 2025-08-24 PROCEDURE — 93000 ELECTROCARDIOGRAM COMPLETE: CPT | Mod: S$GLB,,, | Performed by: NURSE PRACTITIONER

## 2025-08-24 PROCEDURE — 81003 URINALYSIS AUTO W/O SCOPE: CPT | Performed by: EMERGENCY MEDICINE

## 2025-08-24 PROCEDURE — 99214 OFFICE O/P EST MOD 30 MIN: CPT | Mod: S$GLB,,, | Performed by: NURSE PRACTITIONER

## 2025-08-24 PROCEDURE — 25500020 PHARM REV CODE 255: Performed by: STUDENT IN AN ORGANIZED HEALTH CARE EDUCATION/TRAINING PROGRAM

## 2025-08-24 PROCEDURE — 99285 EMERGENCY DEPT VISIT HI MDM: CPT | Mod: 25

## 2025-08-24 PROCEDURE — 80053 COMPREHEN METABOLIC PANEL: CPT | Performed by: EMERGENCY MEDICINE

## 2025-08-24 PROCEDURE — 93005 ELECTROCARDIOGRAM TRACING: CPT | Performed by: INTERNAL MEDICINE

## 2025-08-24 PROCEDURE — 81025 URINE PREGNANCY TEST: CPT | Performed by: EMERGENCY MEDICINE

## 2025-08-24 PROCEDURE — 86803 HEPATITIS C AB TEST: CPT | Performed by: EMERGENCY MEDICINE

## 2025-08-24 PROCEDURE — 85025 COMPLETE CBC W/AUTO DIFF WBC: CPT | Performed by: EMERGENCY MEDICINE

## 2025-08-24 PROCEDURE — 87389 HIV-1 AG W/HIV-1&-2 AB AG IA: CPT | Performed by: EMERGENCY MEDICINE

## 2025-08-24 PROCEDURE — 93010 ELECTROCARDIOGRAM REPORT: CPT | Mod: ,,, | Performed by: INTERNAL MEDICINE

## 2025-08-24 RX ADMIN — IOHEXOL 100 ML: 350 INJECTION, SOLUTION INTRAVENOUS at 04:08

## 2025-08-25 LAB
OHS QRS DURATION: 98 MS
OHS QTC CALCULATION: 412 MS